# Patient Record
Sex: MALE | Race: WHITE | NOT HISPANIC OR LATINO | URBAN - METROPOLITAN AREA
[De-identification: names, ages, dates, MRNs, and addresses within clinical notes are randomized per-mention and may not be internally consistent; named-entity substitution may affect disease eponyms.]

---

## 2023-01-12 ENCOUNTER — EMERGENCY (EMERGENCY)
Facility: HOSPITAL | Age: 63
LOS: 1 days | Discharge: HOME | End: 2023-01-12
Admitting: EMERGENCY MEDICINE
Payer: COMMERCIAL

## 2023-01-12 VITALS
SYSTOLIC BLOOD PRESSURE: 134 MMHG | DIASTOLIC BLOOD PRESSURE: 88 MMHG | TEMPERATURE: 98 F | HEIGHT: 68 IN | OXYGEN SATURATION: 97 % | WEIGHT: 250 LBS | RESPIRATION RATE: 16 BRPM | HEART RATE: 78 BPM

## 2023-01-12 PROCEDURE — 99285 EMERGENCY DEPT VISIT HI MDM: CPT

## 2023-01-12 PROCEDURE — 99284 EMERGENCY DEPT VISIT MOD MDM: CPT

## 2023-01-12 PROCEDURE — 82962 GLUCOSE BLOOD TEST: CPT

## 2023-01-12 NOTE — ED PROVIDER NOTE - NSFOLLOWUPINSTRUCTIONS_ED_ALL_ED_FT
ALCOHOL INTOXICATION - General Information            Alcohol Intoxication    WHAT YOU NEED TO KNOW:    What is alcohol intoxication? Alcohol intoxication is a harmful physical condition caused when you drink more alcohol than your body can handle. It is also called ethanol poisoning, or being drunk.    What do I need to know about recommended alcohol limits?   •Men 21 to 64 years should limit alcohol to 2 drinks a day. Do not have more than 4 drinks in 1 day or more than 14 in 1 week.      •All women, and men 65 or older should limit alcohol to 1 drink in a day. Do not have more than 3 drinks in 1 day or more than 7 in 1 week. No amount of alcohol is okay during pregnancy.      What are common signs and symptoms of alcohol intoxication?   •Breath that smells like alcohol      •Blackouts or seizures      •Enlarged pupils, or eye movements that are faster than normal for you      •Fast heartbeat and slow breaths      •Loss of balance, or no ability to walk straight or stand still      •Nausea and vomiting      •Slurred or loud speech      •Quick mood changes      •Trouble at work or school, or risky behavior, such as unprotected sex or driving while intoxicated      How is alcohol intoxication treated? Your healthcare provider will ask about your use of alcohol. These questions may include how much, how often, and what kind of alcohol you drink. He or she may test your memory. Blood or urine samples may be tested for alcohol and for signs of liver, kidney, or heart damage. Treatment may include any of the following:   •Medicines may be given to help you stay calm, control seizures, or prevent nausea and vomiting. You may also be given glucose or vitamin B1 if your levels are too low.      •In brief intervention therapy, a healthcare provider helps you think about your alcohol use differently. He or she helps you set goals to decrease the amount of alcohol you drink. Therapy may continue after you leave the hospital.      •Extra oxygen may be given if you are so intoxicated that you cannot breathe well on your own.      Where can I find more information?   •Alcoholics Anonymous  Web Address: http://www.aa.org      •Substance Abuse and Mental Health Services Administration  [address]  [place] [address]  Web Address: http://www.[facility].gov        Call your local emergency number (911 in the [place]) if:   •You have sudden trouble breathing or chest pain.      •You have a seizure.      •You feel sad enough to harm yourself or others.      When should I call my doctor?   •You have hallucinations (you see or hear things that are not real).      •You cannot stop vomiting.      •You have questions or concerns about your condition or care.      CARE AGREEMENT:    You have the right to help plan your care. Learn about your health condition and how it may be treated. Discuss treatment options with your healthcare providers to decide what care you want to receive. You always have the right to refuse treatment.

## 2023-01-12 NOTE — ED PROVIDER NOTE - PATIENT PORTAL LINK FT
You can access the FollowMyHealth Patient Portal offered by [facility] by registering at the following website: [email or web address]. By joining [facility]’s FollowMyHealth portal, you will also be able to view your health information using other applications (apps) compatible with our system.

## 2023-01-12 NOTE — ED PROVIDER NOTE - PHYSICAL EXAMINATION
Constitutional: awake and alert, in no acute distress  HEENT: head normocephalic and atraumatic. moist mucous membranes  Eyes: extraocular movements intact, normal conjunctiva  Neck: supple, normal ROM  Cardiovascular: regular rate   Pulmonary: no respiratory distress  Gastrointestinal: abdomen flat and nondistended  Skin: warm, dry, normal for ethnicity  Musculoskeletal: no edema, no deformity, NROM  Neurological: oriented x4, no focal neurologic deficit.   Psychiatric: calm and cooperative, no SI/HI

## 2023-01-12 NOTE — ED PROVIDER NOTE - OBJECTIVE STATEMENT
61 yo male walked into ER with EMS. Pt appears intoxicated. admits drinking tonight. Pt denies any injury or fall, denies using any illicit drugs. As per EMS pt was unsteady on his feet on the street.

## 2023-01-12 NOTE — ED PROVIDER NOTE - PROGRESS NOTE DETAILS
The patient is now awake and alert, clinically sober.  Able to walk a straight line.  Repeat exam and neuro/cranial nerve exams normal.  No evidence of head/neck trauma.  Patient denies any pain or other complaints.  Denies cp/sob/ha/abd pain.  Abd soft, lungs clear, heart exam normal.  Chris po challenge.  Patient says only used alcohol no other substances.  Denies any physical or sexual assault.  Feels much better and pt feels safe for discharge.  No evidence of intoxication at this time or alcohol withdrawal.  No other complaints on discharge.

## 2023-01-12 NOTE — ED ADULT NURSE NOTE - MUSCULOSKELETAL ASSESSMENT
[employer] Pregnancy And Lactation Text: This medication is Pregnancy Category X and is absolutely contraindicated during pregnancy. It is unknown if it is excreted in breast milk. - - -

## 2023-01-12 NOTE — ED ADULT NURSE NOTE - OBJECTIVE STATEMENT
Patient BIBA p/w EtOH intoxication "had 3 drinks", patient is AOx3, denies PMHx/PSHx. Denies drugs use. Patient ambulate w/ unsteady gait, standby assist. No acute respiratory distress noted. Denies sob ,cp ,fever/chill. Speaking in full and clear sentences.

## 2023-01-14 DIAGNOSIS — F10.929 ALCOHOL USE, UNSPECIFIED WITH INTOXICATION, UNSPECIFIED: ICD-10-CM
